# Patient Record
Sex: MALE | Race: BLACK OR AFRICAN AMERICAN | NOT HISPANIC OR LATINO | Employment: OTHER | URBAN - METROPOLITAN AREA
[De-identification: names, ages, dates, MRNs, and addresses within clinical notes are randomized per-mention and may not be internally consistent; named-entity substitution may affect disease eponyms.]

---

## 2022-10-27 DIAGNOSIS — R13.12 DYSPHAGIA, OROPHARYNGEAL PHASE: Primary | ICD-10-CM

## 2022-11-02 DIAGNOSIS — R13.12 OROPHARYNGEAL DYSPHAGIA: Primary | ICD-10-CM

## 2022-11-08 ENCOUNTER — HOSPITAL ENCOUNTER (OUTPATIENT)
Dept: RADIOLOGY | Facility: HOSPITAL | Age: 52
Discharge: HOME | End: 2022-11-08
Attending: INTERNAL MEDICINE
Payer: COMMERCIAL

## 2022-11-08 ENCOUNTER — CLINICAL SUPPORT (OUTPATIENT)
Dept: REHABILITATION | Facility: HOSPITAL | Age: 52
End: 2022-11-08
Attending: INTERNAL MEDICINE
Payer: COMMERCIAL

## 2022-11-08 DIAGNOSIS — R13.12 OROPHARYNGEAL DYSPHAGIA: ICD-10-CM

## 2022-11-08 PROCEDURE — 92611 MOTION FLUOROSCOPY/SWALLOW: CPT

## 2022-11-08 PROCEDURE — 74230 X-RAY XM SWLNG FUNCJ C+: CPT | Mod: TC

## 2022-11-08 PROCEDURE — A9698 NON-RAD CONTRAST MATERIALNOC: HCPCS | Performed by: INTERNAL MEDICINE

## 2022-11-08 PROCEDURE — 25500020 PHARM REV CODE 255: Performed by: INTERNAL MEDICINE

## 2022-11-08 RX ADMIN — BARIUM SULFATE 30 ML: 0.81 POWDER, FOR SUSPENSION ORAL at 01:11

## 2022-11-08 NOTE — PROGRESS NOTES
" MODIFIED BARIUM SWALLOW STUDY  [facility]    Date: 11/8/2022     Name: Jose Tamayo   MRN: [id number]      Physician: Carlene Molina MD  Physician Orders: Fl Modified Barium Swallow Speech    Date of Evaluation:  11/8/2022    Precautions: Standard  Subjective   History of Current Condition: Jose Tamayo is a 52 y.o. male here today for Modified Barium Swallow Study (MBSS). Patient with complaints of food/liquids "getting stuck right here" (pointing to his upper thoracic and lower cervical esophagus region). He denies any complex medical history. Patient states his problems usually resolve if he allows for extra time between bites/sips during PO intake. He states he has been experiencing this issue for a few years with a more recent increase in severity    The following observations were made:   -Mental status: Alert and Cooperative  -Factors affecting performance: no difficulties participating in the study  -Feeding Method: independent in self-feeding    Respiratory Status:   -Respiratory Status: room air    Objective     Cranial Nerve Examination  A cranial nerve examination revealed the following:    Cranial Nerve 5: Trigeminal Nerve  Motor Jaw Posture at rest: Closed  Mandible Elevation/Depression: WFL  Mandible lateralization: WFL  Abnormal movement: absent Interpretation: WFL   Sensory Forehead: WFL  Cheek: WFL  Jaw: WFL  Facial Pain: None noted Interpretation: WFL     Cranial Nerve 7: Facial Nerve  Motor Facial Symmetry: WNL  Wrinkle Forehead: WFL  Close eyes tightly: WFL  Labial Protrusion: WFL  Labial Retraction: WFL  Abnormal movement: absent Interpretation: WFL       Cranial Nerve XII: Hypoglossal Nerve  Motor Tongue at rest: WNL  Lingual Protrusion: WNL  Lingual Protrusion against Resistance: WNL  Lingual Lateralization: WNL  Abnormal movement: absent Interpretation: WFL     Other information:  Mucosal Quality: No abnormal findings  Secretion Management: adequate  Dentition: Good condition for " "speech and mastication       Modified Barium Swallow Study  Purpose: to evaluate anatomy and physiology of the oropharyngeal swallow, to determine effectiveness of rehabilitation strategies, and to determine diet consistency and intervention recommendations. The study was performed using the "Gold Standard" of 30 fps with as low as reasonably achievable (ALARA) exposure.     Consistency  Presentation  Findings   Thin (IDDSI 0) Self-fed; cup sip; lateral view Oral phase: WFL; no residue    Pharyngeal phase: during consecutive straw sips only- transient penetration which spontaneously cleared. No residue or aspiration visualized.    Esophageal screen: UES opens with appropriate bolus entrance into the esophagus.  Esophageal residue observed around the lower cervical/upper thoracic esophagus with retrograde movement to the cervical esophagus noted without reaching the level of the pyriform sinuses.     Puree (extremely thick/ IDDSI 4) Self-fed; spoon; lateral view; AP view  Oral phase: WFL; no residue    Pharyngeal phase: WFL No laryngeal penetration/aspiration observed. No pharyngeal residue observed.     Esophageal screen: UES opens with appropriate bolus entrance into the esophagus. Trace residue observed in the cervical esophagus.      Solid (regular/ IDDSI 7) Self-fed; spoon; lateral view Oral phase: WFL; no residue     Pharyngeal phase: WFL - no laryngeal penetration/aspiration visualized. No pharyngeal residue noted.        Treatment   Treatment Time In:  1330  Treatment Time Out:  1400  Total Treatment Time:  30 minutes  Patient educated regarding results and recommendations of the evaluation. See the recommendations section below.    Education: Anatomy and physiology of swallow mechanism as it relates to MBSS findings and recommendations were discussed with the patient. Patient expressed understanding. All questions were answered.     Assessment     Jose Tamayo is a 52 y.o. male referred for Modified Barium " Swallow Study. The patient presents with WFL oropharyngeal swallow function and suspected esophageal dysphagia. As esophageal residue with retrograde movement was observed despite entering his esophagus appropriately. Further imaging/evaluation may be warrant given patient's complaints being consistent with imaging from this study. SLP to defer further management to GI.     Recommendations:     Consistency Recommendations:  Regular solids and thin liquids  Risk Management/Swallow Guidelines: use good oral hygiene  and sit upright for all PO intake  Specialist Referrals: GI    Please contact [facility] at [phone] if there are questions re: the above or if we can be of additional service to this patient.    Mara Santos

## 2022-12-20 ENCOUNTER — LAB VISIT (OUTPATIENT)
Dept: LAB | Facility: HOSPITAL | Age: 52
End: 2022-12-20
Attending: INTERNAL MEDICINE
Payer: COMMERCIAL

## 2022-12-20 DIAGNOSIS — Z01.812 PRE-OPERATIVE LABORATORY EXAMINATION: Primary | ICD-10-CM

## 2022-12-20 DIAGNOSIS — R13.12 DYSPHAGIA, OROPHARYNGEAL PHASE: ICD-10-CM

## 2022-12-20 LAB
APTT PPP: 26.2 SECONDS (ref 23.4–33.9)
BASOPHILS # BLD AUTO: 0.04 X10(3)/MCL (ref 0–0.2)
BASOPHILS NFR BLD AUTO: 0.5 %
EOSINOPHIL # BLD AUTO: 0.1 X10(3)/MCL (ref 0–0.9)
EOSINOPHIL NFR BLD AUTO: 1.2 %
ERYTHROCYTE [DISTWIDTH] IN BLOOD BY AUTOMATED COUNT: 12.6 % (ref 11.6–14.4)
HCT VFR BLD AUTO: 43.6 % (ref 42–52)
HGB BLD-MCNC: 14.5 GM/DL (ref 14–18)
IMM GRANULOCYTES # BLD AUTO: 0.02 X10(3)/MCL (ref 0–0.04)
IMM GRANULOCYTES NFR BLD AUTO: 0.2 %
INR BLD: 0.97 (ref 2–3)
LYMPHOCYTES # BLD AUTO: 2.86 X10(3)/MCL (ref 0.6–4.6)
LYMPHOCYTES NFR BLD AUTO: 33.7 %
MCH RBC QN AUTO: 28 PG
MCHC RBC AUTO-ENTMCNC: 33.3 MG/DL (ref 33–36)
MCV RBC AUTO: 84.2 FL (ref 80–94)
MONOCYTES # BLD AUTO: 0.53 X10(3)/MCL (ref 0.1–1.3)
MONOCYTES NFR BLD AUTO: 6.2 %
NEUTROPHILS # BLD AUTO: 4.94 X10(3)/MCL (ref 2.1–9.2)
NEUTROPHILS NFR BLD AUTO: 58.2 %
NRBC BLD AUTO-RTO: 0 % (ref 0–1)
PLATELET # BLD AUTO: 351 X10(3)/MCL (ref 140–371)
PMV BLD AUTO: 8.8 FL (ref 9.4–12.4)
PROTHROMBIN TIME: 12.7 SECONDS (ref 11.7–14.5)
RBC # BLD AUTO: 5.18 X10(6)/MCL (ref 4.7–6.1)
WBC # SPEC AUTO: 8.5 X10(3)/MCL (ref 4.5–11.5)

## 2022-12-20 PROCEDURE — 85025 COMPLETE CBC W/AUTO DIFF WBC: CPT

## 2022-12-20 PROCEDURE — 85610 PROTHROMBIN TIME: CPT

## 2022-12-20 PROCEDURE — 85730 THROMBOPLASTIN TIME PARTIAL: CPT

## 2022-12-20 PROCEDURE — 36415 COLL VENOUS BLD VENIPUNCTURE: CPT

## 2023-01-09 ENCOUNTER — HOSPITAL ENCOUNTER (OUTPATIENT)
Facility: HOSPITAL | Age: 53
Discharge: HOME | End: 2023-01-09
Attending: INTERNAL MEDICINE | Admitting: INTERNAL MEDICINE
Payer: COMMERCIAL

## 2023-01-09 ENCOUNTER — ANESTHESIA (OUTPATIENT)
Dept: ENDOSCOPY | Facility: HOSPITAL | Age: 53
End: 2023-01-09
Payer: COMMERCIAL

## 2023-01-09 ENCOUNTER — ANESTHESIA EVENT (OUTPATIENT)
Dept: ENDOSCOPY | Facility: HOSPITAL | Age: 53
End: 2023-01-09
Payer: COMMERCIAL

## 2023-01-09 VITALS
DIASTOLIC BLOOD PRESSURE: 87 MMHG | OXYGEN SATURATION: 98 % | HEART RATE: 71 BPM | TEMPERATURE: 97 F | RESPIRATION RATE: 18 BRPM | SYSTOLIC BLOOD PRESSURE: 137 MMHG

## 2023-01-09 DIAGNOSIS — R13.12 DYSPHAGIA, OROPHARYNGEAL PHASE: Primary | ICD-10-CM

## 2023-01-09 PROCEDURE — C1769 GUIDE WIRE: HCPCS | Performed by: INTERNAL MEDICINE

## 2023-01-09 PROCEDURE — 25000003 PHARM REV CODE 250: Performed by: NURSE ANESTHETIST, CERTIFIED REGISTERED

## 2023-01-09 PROCEDURE — 43245 EGD DILATE STRICTURE: CPT | Performed by: INTERNAL MEDICINE

## 2023-01-09 PROCEDURE — 37000009 HC ANESTHESIA EA ADD 15 MINS: Performed by: INTERNAL MEDICINE

## 2023-01-09 PROCEDURE — 37000008 HC ANESTHESIA 1ST 15 MINUTES: Performed by: INTERNAL MEDICINE

## 2023-01-09 PROCEDURE — 63600175 PHARM REV CODE 636 W HCPCS: Performed by: NURSE ANESTHETIST, CERTIFIED REGISTERED

## 2023-01-09 RX ORDER — PROPOFOL 10 MG/ML
VIAL (ML) INTRAVENOUS
Status: COMPLETED
Start: 2023-01-09 | End: 2023-01-09

## 2023-01-09 RX ORDER — SODIUM CHLORIDE, SODIUM LACTATE, POTASSIUM CHLORIDE, CALCIUM CHLORIDE 600; 310; 30; 20 MG/100ML; MG/100ML; MG/100ML; MG/100ML
INJECTION, SOLUTION INTRAVENOUS CONTINUOUS PRN
Status: DISCONTINUED | OUTPATIENT
Start: 2023-01-09 | End: 2023-01-11

## 2023-01-09 RX ORDER — PROPOFOL 10 MG/ML
INJECTION, EMULSION INTRAVENOUS
Status: DISCONTINUED | OUTPATIENT
Start: 2023-01-09 | End: 2023-01-11

## 2023-01-09 RX ORDER — ENALAPRIL MALEATE 20 MG/1
20 TABLET ORAL DAILY
COMMUNITY

## 2023-01-09 RX ORDER — LIDOCAINE HYDROCHLORIDE 20 MG/ML
INJECTION INTRAVENOUS
Status: DISCONTINUED | OUTPATIENT
Start: 2023-01-09 | End: 2023-01-11

## 2023-01-09 RX ORDER — PRAVASTATIN SODIUM 20 MG/1
20 TABLET ORAL DAILY
COMMUNITY

## 2023-01-09 RX ORDER — AMLODIPINE AND BENAZEPRIL HYDROCHLORIDE 10; 20 MG/1; MG/1
1 CAPSULE ORAL DAILY
COMMUNITY

## 2023-01-09 RX ORDER — LIDOCAINE HYDROCHLORIDE 20 MG/ML
INJECTION, SOLUTION EPIDURAL; INFILTRATION; INTRACAUDAL; PERINEURAL
Status: DISCONTINUED
Start: 2023-01-09 | End: 2023-01-09 | Stop reason: HOSPADM

## 2023-01-09 RX ADMIN — PROPOFOL 60 MG: 10 INJECTION, EMULSION INTRAVENOUS at 11:01

## 2023-01-09 RX ADMIN — SODIUM CHLORIDE, POTASSIUM CHLORIDE, SODIUM LACTATE AND CALCIUM CHLORIDE: 600; 310; 30; 20 INJECTION, SOLUTION INTRAVENOUS at 11:01

## 2023-01-09 RX ADMIN — PROPOFOL 40 MG: 10 INJECTION, EMULSION INTRAVENOUS at 11:01

## 2023-01-09 RX ADMIN — LIDOCAINE HYDROCHLORIDE 50 MG: 20 INJECTION INTRAVENOUS at 11:01

## 2023-01-09 RX ADMIN — PROPOFOL 50 MG: 10 INJECTION, EMULSION INTRAVENOUS at 11:01

## 2023-01-09 NOTE — PROCEDURES
Jose Tamayo   MRN: [id number]   ADMISSION DATE: 2023  : 1970  AGE: 52 y.o.    DATE OF PROCEDURE:  2023     PROCEDURE:  EGD with Savary dilation (45 F)    SURGEON: JESSICA CABRERA    PREOPERATIVE DIAGNOSIS:  History of oropharyngeal dysphagia, intermittent    POSTOPERATIVE DIAGNOSIS:  1.  Increase resistance, upper esophageal sphincter.  Savary dilation with 15 mm dilator performed.    2. Hiatal hernia, small.  Otherwise normal exam    HISTORY AND PHYSICAL:  As per preoperative note.      DESCRIPTION OF PROCEDURE:  Informed consent was obtained.  Patient was placed in left lateral position.  Sedation per anesthesia service.  Olympus video gastroscope was introduced into the oral cavity and esophagus was intubated under direct visualization. The scope was carefully advanced to the distal duodenum.  Patient tolerated the procedure well without any complications.    FINDINGS:  Esophagus:  There was some increased resistance noted on initial introduction of the scope via upper esophageal sphincter.  Savary dilation with 45 [place] dilator was performed at the end of the procedure.   The resistance seemed to have resolved post dilation.  Esophageal mucosa appeared unremarkable.  GE junction at approximately 37 cm.  There was a small hiatal hernia.    Stomach: Fundus, cardia, body and antrum appeared unremarkable.    Duodenum:  Duodenal bulb, 2nd and 3rd portion of the duodenum appeared unremarkable to the extent of exam.    ESTIMATED BLOOD LOSS:  2-3 cc    COMPLICATIONS:  None    RECOMMENDATIONS:  1.  Chew food well.  2. Diet as tolerated.    3. Patient to follow up with me as scheduled

## 2023-01-09 NOTE — DISCHARGE SUMMARY
[facility]  Discharge Note  Short Stay    Procedure(s) (LRB):  EGD (N/A)      OUTCOME: Patient tolerated treatment/procedure well without complication and is now ready for discharge.    DISPOSITION: Home or Self Care    FINAL DIAGNOSIS:  Dysphagia, oropharyngeal phase    FOLLOWUP: In clinic    DISCHARGE INSTRUCTIONS:    Discharge Procedure Orders   Diet general        TIME SPENT ON DISCHARGE: 5 minutes

## 2023-01-09 NOTE — ANESTHESIA PREPROCEDURE EVALUATION
"                                                                                                             01/09/2023  Jose Tamayo is a 52 y.o., male.    History of Current Condition: Jose Tamayo is a 52 y.o. male here today for Modified Barium Swallow Study (MBSS). Patient with complaints of food/liquids "getting stuck right here" (pointing to his upper thoracic and lower cervical esophagus region). He denies any complex medical history. Patient states his problems usually resolve if he allows for extra time between bites/sips during PO intake. He states he has been experiencing this issue for a few years with a more recent increase in severity      Pre-op Assessment    I have reviewed the Patient Summary Reports.     I have reviewed the Nursing Notes. I have reviewed the NPO Status.   I have reviewed the Medications.     Review of Systems  Cardiovascular:   Hypertension           Anesthesia Plan  Type of Anesthesia, risks & benefits discussed:    Anesthesia Type: Gen Natural Airway, MAC  Intra-op Monitoring Plan: Standard ASA Monitors  Induction:  IV  Informed Consent: Informed consent signed with the Patient and all parties understand the risks and agree with anesthesia plan.  All questions answered.   ASA Score: 2    Ready For Surgery From Anesthesia Perspective.     .      "

## 2023-01-09 NOTE — TRANSFER OF CARE
Anesthesia Transfer of Care Note    Patient: Jose Tamayo    Procedure(s) Performed: Procedure(s) (LRB):  EGD (N/A)    Patient location: GI    Anesthesia Type: general    Transport from OR: Transported from OR on room air with adequate spontaneous ventilation. Continuous SpO2 monitoring in transport    Post pain: adequate analgesia    Post assessment: no apparent anesthetic complications    Post vital signs: stable    Level of consciousness: awake    Complications: none    Transfer of care protocol was followedComments: nibp 110/77  Hr 73  rr 17  spo2 98% tempw nl.  Report per sbar. To rn      Last vitals:   Visit Vitals  BP (!) 155/87 (BP Location: Left arm, Patient Position: Lying)   Pulse 75   Temp 36.2 °C (97.2 °F) (Tympanic)   Resp 12   SpO2 100%

## 2023-01-11 NOTE — ANESTHESIA POSTPROCEDURE EVALUATION
Anesthesia Post Evaluation    Patient: Jose Tamayo    Procedure(s) Performed: Procedure(s) (LRB):  EGD (N/A)    Final Anesthesia Type: MAC      Patient location during evaluation: GI PACU  Patient participation: Yes- Able to Participate  Level of consciousness: awake and alert  Post-procedure vital signs: reviewed and stable  Pain management: adequate  Airway patency: patent  OTILIO mitigation strategies: Multimodal analgesia    Anesthetic complications: no      Cardiovascular status: stable  Respiratory status: unassisted  Hydration status: euvolemic  Follow-up not needed.          Vitals Value Taken Time   /87 01/09/23 1148   Temp 37 01/11/23 1511   Pulse 71 01/09/23 1148   Resp 18 01/09/23 1148   SpO2 98 % 01/09/23 1148         No case tracking events are documented in the log.      Pain/Heber Score: No data recorded

## (undated) DEVICE — KIT CANIST SUCTION 1200CC

## (undated) DEVICE — SOL IRRI STRL WATER 1000ML

## (undated) DEVICE — KIT SURGICAL COLON .25 1.1OZ

## (undated) DEVICE — TUBING O2 FEMALE CONN 13FT

## (undated) DEVICE — TIP SUCTION YANKAUER

## (undated) DEVICE — COLLECTION SPECIMEN NEPTUNE

## (undated) DEVICE — Device